# Patient Record
Sex: MALE | Race: OTHER | HISPANIC OR LATINO | URBAN - METROPOLITAN AREA
[De-identification: names, ages, dates, MRNs, and addresses within clinical notes are randomized per-mention and may not be internally consistent; named-entity substitution may affect disease eponyms.]

---

## 2023-10-28 ENCOUNTER — EMERGENCY (EMERGENCY)
Facility: HOSPITAL | Age: 48
LOS: 1 days | Discharge: HOME | End: 2023-10-28
Attending: EMERGENCY MEDICINE
Payer: COMMERCIAL

## 2023-10-28 VITALS
HEIGHT: 66 IN | WEIGHT: 158.07 LBS | TEMPERATURE: 98 F | RESPIRATION RATE: 18 BRPM | OXYGEN SATURATION: 100 % | SYSTOLIC BLOOD PRESSURE: 134 MMHG | HEART RATE: 68 BPM | DIASTOLIC BLOOD PRESSURE: 88 MMHG

## 2023-10-28 PROCEDURE — T1013: CPT

## 2023-10-28 PROCEDURE — 99283 EMERGENCY DEPT VISIT LOW MDM: CPT | Mod: 25

## 2023-10-28 PROCEDURE — 90715 TDAP VACCINE 7 YRS/> IM: CPT

## 2023-10-28 PROCEDURE — 90471 IMMUNIZATION ADMIN: CPT

## 2023-10-28 PROCEDURE — 99284 EMERGENCY DEPT VISIT MOD MDM: CPT

## 2023-10-28 RX ORDER — TETANUS TOXOID, REDUCED DIPHTHERIA TOXOID AND ACELLULAR PERTUSSIS VACCINE, ADSORBED 5; 2.5; 8; 8; 2.5 [IU]/.5ML; [IU]/.5ML; UG/.5ML; UG/.5ML; UG/.5ML
0.5 SUSPENSION INTRAMUSCULAR ONCE
Refills: 0 | Status: COMPLETED | OUTPATIENT
Start: 2023-10-28 | End: 2023-10-28

## 2023-10-28 RX ORDER — POLYMYXIN B SULF/TRIMETHOPRIM 10000-1/ML
1 DROPS OPHTHALMIC (EYE) ONCE
Refills: 0 | Status: COMPLETED | OUTPATIENT
Start: 2023-10-28 | End: 2023-10-28

## 2023-10-28 RX ORDER — POLYMYXIN B SULF/TRIMETHOPRIM 10000-1/ML
1 DROPS OPHTHALMIC (EYE)
Qty: 1 | Refills: 0
Start: 2023-10-28 | End: 2023-10-31

## 2023-10-28 RX ADMIN — Medication 1 DROP(S): at 11:13

## 2023-10-28 RX ADMIN — TETANUS TOXOID, REDUCED DIPHTHERIA TOXOID AND ACELLULAR PERTUSSIS VACCINE, ADSORBED 0.5 MILLILITER(S): 5; 2.5; 8; 8; 2.5 SUSPENSION INTRAMUSCULAR at 11:12

## 2023-10-28 NOTE — ED STATDOCS - PATIENT PORTAL LINK FT
You can access the FollowMyHealth Patient Portal offered by [facility] by registering at the following website: [email or web address]. By joining [employer]’s FollowMyHealth portal, you will also be able to view your health information using other applications (apps) compatible with our system.

## 2023-10-28 NOTE — ED ADULT TRIAGE NOTE - CHIEF COMPLAINT QUOTE
FB to l eye since yesterday, woke up this morning to l eye swelling and redness. denies blurry vison. works in construction

## 2023-10-28 NOTE — ED STATDOCS - CARE PROVIDERS DIRECT ADDRESSES
[email or web address]

## 2023-10-28 NOTE — ED STATDOCS - NSFOLLOWUPINSTRUCTIONS_ED_ALL_ED_FT
1. Follow up with your primary care physician within 2-3days for reevaluation.  2.  Return to the Emergency Department immediately for any worsening, progressive or concerning symptoms.   3. Complete your entire course of antibiotics as prescribed. Do not stop taking antibiotics even if your symptoms improve.     Abrasión corneal  Corneal Abrasion    Dylan abrasión corneal es un rasguño o lesión en la cubierta transparente en la parte delantera del gloria (córnea). La córnea es ydlan especie de cúpula transparente que protege el gloria y ayuda a fijar la vista. La córnea está formada por varias capas, dominic la capa superficial es katarzyna de los tejidos más sensibles del cuerpo. Dylan abrasión corneal puede resultar muy dolorosa.    Si no se trata, puede infectarse y producir dylan úlcera. [place] puede dejar cicatrices. Las cicatrices en la córnea pueden afectar la vista. A veces, las abrasiones pueden volver a aparecer en la misma yady, incluso después de que la lesión original haya cicatrizado.    ¿Cuáles son las causas?  Esta afección puede ser causada por lo siguiente:  Un golpe o pinchazo en el gloria.  Dylan sustancia arenosa o irritante (cuerpo extraño) en el gloria.  Frotarse el gloria en exceso.  Ojos muy secos.  Ciertas infecciones oculares.  Lentes de contacto que no encajan radha o que se usan elmer períodos prolongados. También puede lastimarse la córnea cuando se pone o se joseph los lentes de contacto.  Cirugía ocular.  Ciertos problemas de la córnea pueden aumentar la probabilidad de dylan abrasión corneal.  A veces, la causa es desconocida.    ¿Cuáles son los signos o síntomas?  Los síntomas de esta afección incluyen:  Dolor en el gloria. El dolor puede empeorar al abrir y cerrar el gloria, o al moverlo.  Sensación de que tiene algo metido en el gloria.  Lagrimeo, enrojecimiento y sensibilidad a la constantine.  Dificultad para mantener los ojos abiertos o imposibilidad de mantenerlos abiertos.  Visión borrosa.  Dolor de chrissy.  ¿Cómo se diagnostica?  Puede consultar a un especialista en afecciones y enfermedades oculares (oftalmólogo). Esta afección se puede diagnosticar en función de los síntomas, antecedentes médicos y un examen ocular.    Antes del examen ocular, es posible que le coloquen gotas anestésicas dentro del gloria. También es posible que le coloquen un tinte con un gotero o con dylan pequeña pearl de papel. Con el tinte, al oftalmólogo le resulta más fácil latricia la abrasión cuando le examina el gloria con dylan constantine. El oftalmólogo puede examinarle el gloria a través de un oftalmoscopio (lámpara de hendidura).    ¿Cómo se trata?  El tratamiento puede variar según la causa de la afección, y puede incluir lo siguiente:  Lavado del gloria.  Extracción de cualquier cuerpo extraño que se encuentre en el gloria.  Usar gotas o ungüentos con antibiótico para tratar o prevenir dylan infección.  Usar dylan gota dilatadora para disminuir la inflamación y el dolor.  Usar gotas o ungüentos con corticoesteroides para tratar el enrojecimiento, la irritación o la inflamación.  Aplicar un paño frío y húmedo (compresa fría) o compresa de hielo para aliviar el dolor.  Jolie analgésicos por boca (por vía oral).  En algunos casos, también podría usarse un parche ocular o lentes de contacto blandos. No debe usarse un parche ocular si la abrasión corneal tuvo relación con el uso de lentes de contacto, ya que puede aumentar la probabilidad de infección en estos ojos.    Siga estas instrucciones en choi casa:  Medicamentos    Use los ungüentos o las gotas oftálmicas abhijit ricki se lo haya indicado el médico.  Si le recetaron antibiótico en forma de gotas o ungüento, úselo según las indicaciones del médico. No deje de usar el antibiótico aunque comience a sentirse mejor.  Use los medicamentos de venta lyric y los recetados solamente ricki se lo haya indicado el médico.  Pregúntele al médico si el medicamento recetado:  Hace necesario que evite conducir o usar maquinaria pesada.  Puede causarle estreñimiento. Es posible que tenga que jolie estas medidas para prevenir o tratar el estreñimiento:  Beber suficiente líquido ricki para mantener la orina de color amarillo pálido.  Usar medicamentos recetados o de venta lyric.  Consumir alimentos ricos en fibra, ricki frijoles, cereales integrales, y frutas y verduras frescas.  Limitar el consumo de alimentos ricos en grasa y azúcares procesados, ricki los alimentos fritos o dulces.  Uso del parche ocular    Si tiene un parche ocular, úselo según las indicaciones del médico.  No conduzca ni use maquinaria mientras usa el parche ocular. En estas condiciones no puede juzgar correctamente las distancias.  Siga las instrucciones del médico acerca de cuándo retirar el parche.  Instrucciones generales    Pregúntele al médico si puede usar dylan compresa fría en el gloria para aliviar el dolor.  No se toque ni se frote el gloria. No se lave el gloria.  No use lentes de contacto hasta que el médico lo autorice.  Evite la constantine tobi y la fatiga ocular.  Concurra a todas las visitas de seguimiento ricki se lo haya indicado el médico. [place] es importante para prevenir infecciones y evitar la pérdida de visión.  Comuníquese con un médico si:  Continúa con dolor en el gloria y otros síntomas elmer más de 2 días.  Tiene síntomas nuevos, ricki empeoramiento del enrojecimiento, lagrimeo o secreción.  Tiene dylan secreción que le femi los ojos pegados a la mañana.  El parche ocular se afloja al punto que puede parpadear.  Los síntomas vuelven a aparecer después de que la abrasión original haya cicatrizado.  Solicite ayuda inmediatamente si:  Tiene dolor intenso en el gloria que no mejora con medicamentos.  Presenta pérdida de la visión.  Resumen  Dylan abrasión corneal es un rasguño o lesión en la cubierta transparente en la parte delantera del gloria (córnea).  Es importante recibir tratamiento para la abrasión corneal. Si twin problema no se trata, puede afectar la visión.  Use los ungüentos o las gotas oftálmicas abhijit ricki se lo haya indicado el médico.  Si tiene un parche ocular, no conduzca ni use maquinaria mientras lo usa. En estas condiciones no puede juzgar correctamente las distancias.  Informe al médico si leila síntomas continúan elmer más de 2 días.  Esta información no tiene ricki fin reemplazar el consejo del médico. Asegúrese de hacerle al médico cualquier pregunta que tenga.

## 2023-10-28 NOTE — ED STATDOCS - PHYSICAL EXAMINATION
Gen: NAD, AOx3  Head: NCAT  HEENT: (+) corneal abrasion at 3 o'clock position and conjunctival injection of left eye, PERRL, EOMI without pain or entrapment, oral mucosa moist  Lung: CTAB, no respiratory distress, no wheezing, rales, rhonchi  CV: normal s1/s2, rrr, no murmurs, Normal perfusion, pulses 2+ throughout  MSK: No edema, no visible deformities, full range of motion in all 4 extremities  Neuro: CN II-XII grossly intact, No focal neurologic deficits  Skin: No rash   Psych: normal affect

## 2023-10-28 NOTE — ED STATDOCS - CARE PROVIDER_API CALL
Gualberto Del Rio  Ophthalmology  [address] [place] [address], [address]  [place] [address]  Phone: [phone]  Fax: [phone]  Follow Up Time: Urgent

## 2023-10-28 NOTE — ED STATDOCS - OBJECTIVE STATEMENT
49 y/o male with PMHx of HLD states he was cutting wood last night and felt something enter the left eye. Reports flushing it with saline last night. Reports waking up with pain of corner of left eye, redness, and swelling. Denies using contact lenses. Denies wearing eye protection yesterday. Unsure when last tetanus vaccine was.    ED : Samaria

## 2023-12-23 ENCOUNTER — OFFICE (OUTPATIENT)
Dept: URBAN - METROPOLITAN AREA CLINIC 63 | Facility: CLINIC | Age: 48
Setting detail: OPHTHALMOLOGY
End: 2023-12-23
Payer: COMMERCIAL

## 2023-12-23 DIAGNOSIS — H40.013: ICD-10-CM

## 2023-12-23 DIAGNOSIS — H25.13: ICD-10-CM

## 2023-12-23 DIAGNOSIS — H11.043: ICD-10-CM

## 2023-12-23 DIAGNOSIS — H16.223: ICD-10-CM

## 2023-12-23 PROCEDURE — 92133 CPTRZD OPH DX IMG PST SGM ON: CPT | Performed by: STUDENT IN AN ORGANIZED HEALTH CARE EDUCATION/TRAINING PROGRAM

## 2023-12-23 PROCEDURE — 92004 COMPRE OPH EXAM NEW PT 1/>: CPT | Performed by: STUDENT IN AN ORGANIZED HEALTH CARE EDUCATION/TRAINING PROGRAM

## 2023-12-23 ASSESSMENT — REFRACTION_AUTOREFRACTION
OS_AXIS: 074
OS_SPHERE: -0.50
OD_SPHERE: -0.50
OD_AXIS: 085
OD_CYLINDER: -0.50
OS_CYLINDER: -0.25

## 2023-12-23 ASSESSMENT — CORNEAL PTERYGIUM
OD_PTERYGIUM: NASAL 2MM 1MM
OS_PTERYGIUM: NASAL 1MM

## 2023-12-23 ASSESSMENT — CONFRONTATIONAL VISUAL FIELD TEST (CVF)
OS_FINDINGS: FULL
OD_FINDINGS: FULL

## 2023-12-23 ASSESSMENT — SPHEQUIV_DERIVED
OS_SPHEQUIV: -0.625
OD_SPHEQUIV: -0.75

## 2023-12-23 ASSESSMENT — SUPERFICIAL PUNCTATE KERATITIS (SPK)
OD_SPK: 1+
OS_SPK: 1+

## 2025-01-05 ENCOUNTER — OFFICE (OUTPATIENT)
Dept: URBAN - METROPOLITAN AREA CLINIC 110 | Facility: CLINIC | Age: 50
Setting detail: OPHTHALMOLOGY
End: 2025-01-05
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
## 2025-01-05 PROCEDURE — NO SHOW FE NO SHOW FEE: Performed by: STUDENT IN AN ORGANIZED HEALTH CARE EDUCATION/TRAINING PROGRAM
